# Patient Record
Sex: MALE | Race: WHITE | NOT HISPANIC OR LATINO
[De-identification: names, ages, dates, MRNs, and addresses within clinical notes are randomized per-mention and may not be internally consistent; named-entity substitution may affect disease eponyms.]

---

## 2017-02-16 ENCOUNTER — OTHER (OUTPATIENT)
Age: 62
End: 2017-02-16

## 2017-02-16 DIAGNOSIS — R07.89 OTHER CHEST PAIN: ICD-10-CM

## 2017-02-17 ENCOUNTER — OUTPATIENT (OUTPATIENT)
Dept: OUTPATIENT SERVICES | Facility: HOSPITAL | Age: 62
LOS: 1 days | End: 2017-02-17
Payer: COMMERCIAL

## 2017-02-17 ENCOUNTER — OUTPATIENT (OUTPATIENT)
Dept: OUTPATIENT SERVICES | Facility: HOSPITAL | Age: 62
LOS: 1 days | Discharge: HOME | End: 2017-02-17
Payer: COMMERCIAL

## 2017-02-17 ENCOUNTER — TRANSCRIPTION ENCOUNTER (OUTPATIENT)
Age: 62
End: 2017-02-17

## 2017-02-17 VITALS
RESPIRATION RATE: 12 BRPM | HEIGHT: 74 IN | DIASTOLIC BLOOD PRESSURE: 78 MMHG | WEIGHT: 220.02 LBS | HEART RATE: 78 BPM | OXYGEN SATURATION: 98 % | TEMPERATURE: 98 F | SYSTOLIC BLOOD PRESSURE: 125 MMHG

## 2017-02-17 VITALS
HEART RATE: 52 BPM | DIASTOLIC BLOOD PRESSURE: 73 MMHG | SYSTOLIC BLOOD PRESSURE: 133 MMHG | OXYGEN SATURATION: 97 % | RESPIRATION RATE: 16 BRPM

## 2017-02-17 DIAGNOSIS — R07.89 OTHER CHEST PAIN: ICD-10-CM

## 2017-02-17 DIAGNOSIS — Z90.49 ACQUIRED ABSENCE OF OTHER SPECIFIED PARTS OF DIGESTIVE TRACT: Chronic | ICD-10-CM

## 2017-02-17 LAB
ANION GAP SERPL CALC-SCNC: 13 MMOL/L — SIGNIFICANT CHANGE UP (ref 5–17)
APTT BLD: 38.9 SEC — HIGH (ref 27.5–37.4)
BLD GP AB SCN SERPL QL: SIGNIFICANT CHANGE UP
BUN SERPL-MCNC: 18 MG/DL — SIGNIFICANT CHANGE UP (ref 8–20)
CALCIUM SERPL-MCNC: 9.4 MG/DL — SIGNIFICANT CHANGE UP (ref 8.6–10.2)
CHLORIDE SERPL-SCNC: 102 MMOL/L — SIGNIFICANT CHANGE UP (ref 98–107)
CO2 SERPL-SCNC: 27 MMOL/L — SIGNIFICANT CHANGE UP (ref 22–29)
CREAT SERPL-MCNC: 0.96 MG/DL — SIGNIFICANT CHANGE UP (ref 0.5–1.3)
GLUCOSE SERPL-MCNC: 108 MG/DL — SIGNIFICANT CHANGE UP (ref 70–115)
HCT VFR BLD CALC: 45.5 % — SIGNIFICANT CHANGE UP (ref 42–52)
HGB BLD-MCNC: 15.8 G/DL — SIGNIFICANT CHANGE UP (ref 14–18)
INR BLD: 1.11 RATIO — SIGNIFICANT CHANGE UP (ref 0.88–1.16)
MCHC RBC-ENTMCNC: 31.8 PG — HIGH (ref 27–31)
MCHC RBC-ENTMCNC: 34.7 G/DL — SIGNIFICANT CHANGE UP (ref 32–36)
MCV RBC AUTO: 91.5 FL — SIGNIFICANT CHANGE UP (ref 80–94)
PLATELET # BLD AUTO: 192 K/UL — SIGNIFICANT CHANGE UP (ref 150–400)
POTASSIUM SERPL-MCNC: 4.2 MMOL/L — SIGNIFICANT CHANGE UP (ref 3.5–5.3)
POTASSIUM SERPL-SCNC: 4.2 MMOL/L — SIGNIFICANT CHANGE UP (ref 3.5–5.3)
PROTHROM AB SERPL-ACNC: 12.2 SEC — SIGNIFICANT CHANGE UP (ref 10–13.1)
RBC # BLD: 4.97 M/UL — SIGNIFICANT CHANGE UP (ref 4.6–6.2)
RBC # FLD: 13.1 % — SIGNIFICANT CHANGE UP (ref 11–15.6)
SODIUM SERPL-SCNC: 142 MMOL/L — SIGNIFICANT CHANGE UP (ref 135–145)
TYPE + AB SCN PNL BLD: SIGNIFICANT CHANGE UP
WBC # BLD: 6.1 K/UL — SIGNIFICANT CHANGE UP (ref 4.8–10.8)
WBC # FLD AUTO: 6.1 K/UL — SIGNIFICANT CHANGE UP (ref 4.8–10.8)

## 2017-02-17 PROCEDURE — 86850 RBC ANTIBODY SCREEN: CPT

## 2017-02-17 PROCEDURE — 93005 ELECTROCARDIOGRAM TRACING: CPT

## 2017-02-17 PROCEDURE — C1887: CPT

## 2017-02-17 PROCEDURE — 93306 TTE W/DOPPLER COMPLETE: CPT

## 2017-02-17 PROCEDURE — 85730 THROMBOPLASTIN TIME PARTIAL: CPT

## 2017-02-17 PROCEDURE — 86901 BLOOD TYPING SEROLOGIC RH(D): CPT

## 2017-02-17 PROCEDURE — 93306 TTE W/DOPPLER COMPLETE: CPT | Mod: 26,59

## 2017-02-17 PROCEDURE — 93351 STRESS TTE COMPLETE: CPT

## 2017-02-17 PROCEDURE — 85610 PROTHROMBIN TIME: CPT

## 2017-02-17 PROCEDURE — 86900 BLOOD TYPING SEROLOGIC ABO: CPT

## 2017-02-17 PROCEDURE — 80048 BASIC METABOLIC PNL TOTAL CA: CPT

## 2017-02-17 PROCEDURE — 93351 STRESS TTE COMPLETE: CPT | Mod: 26

## 2017-02-17 PROCEDURE — C1894: CPT

## 2017-02-17 PROCEDURE — 85027 COMPLETE CBC AUTOMATED: CPT

## 2017-02-17 PROCEDURE — 93454 CORONARY ARTERY ANGIO S&I: CPT

## 2017-02-17 PROCEDURE — 93010 ELECTROCARDIOGRAM REPORT: CPT

## 2017-02-17 PROCEDURE — C1769: CPT

## 2017-02-17 PROCEDURE — 93454 CORONARY ARTERY ANGIO S&I: CPT | Mod: 26

## 2017-02-17 RX ORDER — ATORVASTATIN CALCIUM 80 MG/1
1 TABLET, FILM COATED ORAL
Qty: 0 | Refills: 0 | COMMUNITY

## 2017-02-17 RX ORDER — ASPIRIN/CALCIUM CARB/MAGNESIUM 324 MG
1 TABLET ORAL
Qty: 0 | Refills: 0 | COMMUNITY

## 2017-02-17 NOTE — H&P CARDIOLOGY - HISTORY OF PRESENT ILLNESS
60 yo male history of hyperlipidemia exercises regularly developed chest pressure while weight lifting.  He had recently followed up with a Nuclear Stress test.  The nuclear stress test was positive of anterior wall motion abnormality.

## 2017-02-17 NOTE — DISCHARGE NOTE ADULT - PLAN OF CARE
Optimal cardiac function Restricted use with no heavy lifting of affected arm for 48 hours.  No submerging the arm in water for 48 hours.  You may start showering today.  Call your doctor for any bleeding, swelling, loss of sensation in the hand or fingers, or fingers turning blue.  If heavy bleeding or large lumps form, hold pressure at the spot and come to the Emergency Room.

## 2017-02-17 NOTE — DISCHARGE NOTE ADULT - INSTRUCTIONS
Choose lean meats and poultry without skin and prepare them without added saturated and trans fat.  Eat fish at least twice a week. Recent research shows that eating oily fish containing omega-3 fatty acids (for example, salmon, trout and herring) may help lower your risk of death from coronary artery disease.  Select fat-free, 1 percent fat and low-fat dairy products.  Cut back on foods containing partially hydrogenated vegetable oils to reduce trans fat in your diet.   To lower cholesterol, reduce saturated fat to no more than 5 to 6 percent of total calories. For someone eating 2,000 calories a day, that’s about 13 grams of saturated fat.  Cut back on beverages and foods with added sugars.  Choose and prepare foods with little or no salt. To lower blood pressure, aim to eat no more than 2,400 milligrams of sodium per day. Reducing daily intake to 1,500 mg is desirable because it can lower blood pressure even further.  If you drink alcohol, drink in moderation. That means one drink per day if you’re a woman and two drinks  per day if you’re a man.  Follow the American Heart Association recommendations when you eat out, and keep an eye on your portion sizes. d/c instructions and med rec

## 2017-02-17 NOTE — DISCHARGE NOTE ADULT - MEDICATION SUMMARY - MEDICATIONS TO TAKE
I will START or STAY ON the medications listed below when I get home from the hospital:    Aspirin Enteric Coated 81 mg oral delayed release tablet  -- 1 tab(s) by mouth once a day  -- Indication: For aspirin    Lipitor 20 mg oral tablet  -- 1 tab(s) by mouth once a day  -- Indication: For cholesterol

## 2017-02-17 NOTE — DISCHARGE NOTE ADULT - NS AS ACTIVITY OBS
No Heavy lifting/straining/Walking-Outdoors allowed/Showering allowed/Do not drive or operate machinery/Walking-Indoors allowed

## 2017-02-17 NOTE — DISCHARGE NOTE ADULT - CARE PROVIDER_API CALL
Perry Clemons), Cardiovascular Disease  [address] [place] [id number]  Phone: [phone]  Fax: [phone]

## 2017-02-17 NOTE — DISCHARGE NOTE ADULT - HOSPITAL COURSE
60 yo male history of hyperlipidemia exercises regularly developed chest pressure while weight lifting.  He had recently followed up with a Nuclear Stress test.  The nuclear stress test was positive of anterior wall motion abnormality. now S/P [facility] no intervention, via right radial artery, right wrist site benign.     Plan:  Remove wrist band in 1 hour  Wrist management discussed with patient  Continue current meds  Follow up with cardiologist in 2 weeks or sooner if needed  Bedrest x 1 hour  Discharge at 2100 if wrist site stable

## 2017-02-17 NOTE — DISCHARGE NOTE ADULT - CARE PROVIDERS DIRECT ADDRESSES
[email or web address]

## 2017-02-17 NOTE — DISCHARGE NOTE ADULT - CARE PLAN
Principal Discharge DX:	Abnormal stress test  Goal:	Optimal cardiac function  Instructions for follow-up, activity and diet:	Restricted use with no heavy lifting of affected arm for 48 hours.  No submerging the arm in water for 48 hours.  You may start showering today.  Call your doctor for any bleeding, swelling, loss of sensation in the hand or fingers, or fingers turning blue.  If heavy bleeding or large lumps form, hold pressure at the spot and come to the Emergency Room.

## 2017-02-20 DIAGNOSIS — R07.89 OTHER CHEST PAIN: ICD-10-CM

## 2017-02-24 DIAGNOSIS — R94.39 ABNORMAL RESULT OF OTHER CARDIOVASCULAR FUNCTION STUDY: ICD-10-CM

## 2017-05-15 ENCOUNTER — APPOINTMENT (OUTPATIENT)
Dept: DERMATOLOGY | Facility: CLINIC | Age: 62
End: 2017-05-15

## 2017-08-06 ENCOUNTER — RESULT REVIEW (OUTPATIENT)
Age: 62
End: 2017-08-06

## 2017-08-07 ENCOUNTER — APPOINTMENT (OUTPATIENT)
Dept: DERMATOLOGY | Facility: CLINIC | Age: 62
End: 2017-08-07
Payer: COMMERCIAL

## 2017-08-07 PROCEDURE — 11100 BX SKIN SUBCUTANEOUS&/MUCOUS MEMBRANE 1 LESION: CPT

## 2017-08-21 ENCOUNTER — APPOINTMENT (OUTPATIENT)
Dept: DERMATOLOGY | Facility: CLINIC | Age: 62
End: 2017-08-21

## 2018-01-22 ENCOUNTER — RESULT REVIEW (OUTPATIENT)
Age: 63
End: 2018-01-22

## 2018-01-22 ENCOUNTER — APPOINTMENT (OUTPATIENT)
Dept: DERMATOLOGY | Facility: CLINIC | Age: 63
End: 2018-01-22
Payer: COMMERCIAL

## 2018-01-22 PROCEDURE — 11100 BX SKIN SUBCUTANEOUS&/MUCOUS MEMBRANE 1 LESION: CPT

## 2018-03-13 ENCOUNTER — LABORATORY RESULT (OUTPATIENT)
Age: 63
End: 2018-03-13

## 2018-03-13 ENCOUNTER — APPOINTMENT (OUTPATIENT)
Dept: VASCULAR SURGERY | Facility: CLINIC | Age: 63
End: 2018-03-13
Payer: COMMERCIAL

## 2018-03-13 ENCOUNTER — APPOINTMENT (OUTPATIENT)
Dept: VASCULAR SURGERY | Facility: CLINIC | Age: 63
End: 2018-03-13

## 2018-03-13 VITALS
DIASTOLIC BLOOD PRESSURE: 71 MMHG | RESPIRATION RATE: 15 BRPM | WEIGHT: 208 LBS | HEIGHT: 73 IN | SYSTOLIC BLOOD PRESSURE: 127 MMHG | OXYGEN SATURATION: 99 % | BODY MASS INDEX: 27.57 KG/M2 | TEMPERATURE: 97.8 F | HEART RATE: 55 BPM

## 2018-03-13 DIAGNOSIS — Z82.49 FAMILY HISTORY OF ISCHEMIC HEART DISEASE AND OTHER DISEASES OF THE CIRCULATORY SYSTEM: ICD-10-CM

## 2018-03-13 DIAGNOSIS — Z85.820 PERSONAL HISTORY OF MALIGNANT MELANOMA OF SKIN: ICD-10-CM

## 2018-03-13 DIAGNOSIS — Z86.79 PERSONAL HISTORY OF OTHER DISEASES OF THE CIRCULATORY SYSTEM: ICD-10-CM

## 2018-03-13 DIAGNOSIS — L98.9 DISORDER OF THE SKIN AND SUBCUTANEOUS TISSUE, UNSPECIFIED: ICD-10-CM

## 2018-03-13 PROCEDURE — 24065 BIOPSY ARM/ELBOW SOFT TISSUE: CPT

## 2018-03-13 PROCEDURE — 99204 OFFICE O/P NEW MOD 45 MIN: CPT

## 2018-06-11 ENCOUNTER — APPOINTMENT (OUTPATIENT)
Dept: DERMATOLOGY | Facility: CLINIC | Age: 63
End: 2018-06-11

## 2018-10-25 PROBLEM — E78.5 HYPERLIPIDEMIA, UNSPECIFIED: Chronic | Status: ACTIVE | Noted: 2017-02-17

## 2018-10-31 ENCOUNTER — APPOINTMENT (OUTPATIENT)
Dept: VASCULAR SURGERY | Facility: CLINIC | Age: 63
End: 2018-10-31
Payer: COMMERCIAL

## 2018-10-31 VITALS
BODY MASS INDEX: 27.04 KG/M2 | OXYGEN SATURATION: 97 % | HEIGHT: 73 IN | DIASTOLIC BLOOD PRESSURE: 73 MMHG | WEIGHT: 204 LBS | SYSTOLIC BLOOD PRESSURE: 127 MMHG | TEMPERATURE: 97.7 F | HEART RATE: 76 BPM

## 2018-10-31 PROCEDURE — 93970 EXTREMITY STUDY: CPT

## 2018-10-31 PROCEDURE — 99214 OFFICE O/P EST MOD 30 MIN: CPT

## 2018-10-31 RX ORDER — ASPIRIN 81 MG
81 TABLET, DELAYED RELEASE (ENTERIC COATED) ORAL
Refills: 0 | Status: ACTIVE | COMMUNITY

## 2018-11-16 ENCOUNTER — APPOINTMENT (OUTPATIENT)
Dept: DERMATOLOGY | Facility: CLINIC | Age: 63
End: 2018-11-16
Payer: COMMERCIAL

## 2018-11-16 PROCEDURE — ZZZZZ: CPT

## 2019-01-23 ENCOUNTER — APPOINTMENT (OUTPATIENT)
Dept: DERMATOLOGY | Facility: CLINIC | Age: 64
End: 2019-01-23
Payer: COMMERCIAL

## 2019-01-23 PROCEDURE — 99212 OFFICE O/P EST SF 10 MIN: CPT

## 2019-01-28 ENCOUNTER — APPOINTMENT (OUTPATIENT)
Dept: DERMATOLOGY | Facility: CLINIC | Age: 64
End: 2019-01-28

## 2019-01-30 ENCOUNTER — APPOINTMENT (OUTPATIENT)
Age: 64
End: 2019-01-30
Payer: COMMERCIAL

## 2019-01-30 VITALS
HEIGHT: 73 IN | RESPIRATION RATE: 16 BRPM | OXYGEN SATURATION: 99 % | WEIGHT: 209 LBS | SYSTOLIC BLOOD PRESSURE: 99 MMHG | BODY MASS INDEX: 27.7 KG/M2 | HEART RATE: 59 BPM | TEMPERATURE: 97.9 F | DIASTOLIC BLOOD PRESSURE: 65 MMHG

## 2019-01-30 VITALS — DIASTOLIC BLOOD PRESSURE: 69 MMHG | SYSTOLIC BLOOD PRESSURE: 116 MMHG

## 2019-01-30 PROCEDURE — 36471 NJX SCLRSNT MLT INCMPTNT VN: CPT

## 2019-03-18 RX ORDER — VITAMIN B COMPLEX
CAPSULE ORAL
Refills: 0 | Status: ACTIVE | COMMUNITY

## 2019-03-18 RX ORDER — CHOLECALCIFEROL (VITAMIN D3) 250 MCG
250 MCG CAPSULE ORAL
Refills: 0 | Status: ACTIVE | COMMUNITY

## 2019-03-18 RX ORDER — MULTIVIT-MIN/IRON/FOLIC ACID/K 18-600-40
CAPSULE ORAL
Refills: 0 | Status: ACTIVE | COMMUNITY

## 2019-03-18 RX ORDER — OMEGA-3/DHA/EPA/FISH OIL 300-1000MG
1000 CAPSULE,DELAYED RELEASE (ENTERIC COATED) ORAL
Refills: 0 | Status: ACTIVE | COMMUNITY

## 2019-03-18 NOTE — PROCEDURE
[FreeTextEntry1] : right leg foam sclerotherapy [FreeTextEntry2] : Venous stasis and reflux [FreeTextEntry3] : Under aseptic technique after informed consent obtained, the large varicose veins from right leg were injected with polidocanol 1% solution using 30 gauge needle. Pressure dressing applied. Pt tolerated well.

## 2019-04-16 ENCOUNTER — RESULT REVIEW (OUTPATIENT)
Age: 64
End: 2019-04-16

## 2019-04-17 ENCOUNTER — APPOINTMENT (OUTPATIENT)
Dept: DERMATOLOGY | Facility: CLINIC | Age: 64
End: 2019-04-17
Payer: COMMERCIAL

## 2019-04-17 PROCEDURE — 99213 OFFICE O/P EST LOW 20 MIN: CPT

## 2020-04-26 ENCOUNTER — MESSAGE (OUTPATIENT)
Age: 65
End: 2020-04-26

## 2021-01-26 DIAGNOSIS — R06.00 DYSPNEA, UNSPECIFIED: ICD-10-CM

## 2021-01-28 ENCOUNTER — APPOINTMENT (OUTPATIENT)
Dept: CARDIOLOGY | Facility: CLINIC | Age: 66
End: 2021-01-28

## 2021-02-08 ENCOUNTER — FORM ENCOUNTER (OUTPATIENT)
Age: 66
End: 2021-02-08

## 2021-02-09 ENCOUNTER — OUTPATIENT (OUTPATIENT)
Dept: OUTPATIENT SERVICES | Facility: HOSPITAL | Age: 66
LOS: 1 days | End: 2021-02-09
Payer: COMMERCIAL

## 2021-02-09 DIAGNOSIS — R06.00 DYSPNEA, UNSPECIFIED: ICD-10-CM

## 2021-02-09 DIAGNOSIS — Z90.49 ACQUIRED ABSENCE OF OTHER SPECIFIED PARTS OF DIGESTIVE TRACT: Chronic | ICD-10-CM

## 2021-02-09 PROCEDURE — 93306 TTE W/DOPPLER COMPLETE: CPT | Mod: 26

## 2021-02-09 PROCEDURE — 93306 TTE W/DOPPLER COMPLETE: CPT

## 2021-02-13 DIAGNOSIS — E03.9 HYPOTHYROIDISM, UNSPECIFIED: ICD-10-CM

## 2021-02-13 DIAGNOSIS — Z86.39 PERSONAL HISTORY OF OTHER ENDOCRINE, NUTRITIONAL AND METABOLIC DISEASE: ICD-10-CM

## 2021-02-13 LAB
25(OH)D3 SERPL-MCNC: 53.8 NG/ML
ALBUMIN SERPL ELPH-MCNC: 4.4 G/DL
ALP BLD-CCNC: 55 U/L
ALT SERPL-CCNC: 34 U/L
ANION GAP SERPL CALC-SCNC: 16 MMOL/L
AST SERPL-CCNC: 24 U/L
BASOPHILS # BLD AUTO: 0.05 K/UL
BASOPHILS NFR BLD AUTO: 0.9 %
BILIRUB SERPL-MCNC: 0.5 MG/DL
BUN SERPL-MCNC: 19 MG/DL
CALCIUM SERPL-MCNC: 9.7 MG/DL
CHLORIDE SERPL-SCNC: 103 MMOL/L
CHOLEST SERPL-MCNC: 166 MG/DL
CO2 SERPL-SCNC: 22 MMOL/L
CREAT SERPL-MCNC: 1.16 MG/DL
EOSINOPHIL # BLD AUTO: 0.21 K/UL
EOSINOPHIL NFR BLD AUTO: 3.7 %
GLUCOSE SERPL-MCNC: 97 MG/DL
HCT VFR BLD CALC: 48.3 %
HDLC SERPL-MCNC: 47 MG/DL
HGB BLD-MCNC: 16.2 G/DL
IMM GRANULOCYTES NFR BLD AUTO: 0.2 %
LDLC SERPL CALC-MCNC: 96 MG/DL
LYMPHOCYTES # BLD AUTO: 1.92 K/UL
LYMPHOCYTES NFR BLD AUTO: 33.7 %
MAN DIFF?: NORMAL
MCHC RBC-ENTMCNC: 31.8 PG
MCHC RBC-ENTMCNC: 33.5 GM/DL
MCV RBC AUTO: 94.7 FL
MONOCYTES # BLD AUTO: 0.62 K/UL
MONOCYTES NFR BLD AUTO: 10.9 %
NEUTROPHILS # BLD AUTO: 2.89 K/UL
NEUTROPHILS NFR BLD AUTO: 50.6 %
NONHDLC SERPL-MCNC: 119 MG/DL
NT-PROBNP SERPL-MCNC: 29 PG/ML
PLATELET # BLD AUTO: 206 K/UL
POTASSIUM SERPL-SCNC: 4.3 MMOL/L
PROT SERPL-MCNC: 6.7 G/DL
PSA SERPL-MCNC: 3.27 NG/ML
RBC # BLD: 5.1 M/UL
RBC # FLD: 12.7 %
SODIUM SERPL-SCNC: 141 MMOL/L
TRIGL SERPL-MCNC: 119 MG/DL
TSH SERPL-ACNC: 5.11 UIU/ML
WBC # FLD AUTO: 5.7 K/UL

## 2021-02-18 LAB
ALBUMIN SERPL ELPH-MCNC: 4.5 G/DL
ALP BLD-CCNC: 57 U/L
ALT SERPL-CCNC: 37 U/L
AST SERPL-CCNC: 26 U/L
BILIRUB DIRECT SERPL-MCNC: 0.2 MG/DL
BILIRUB INDIRECT SERPL-MCNC: 0.6 MG/DL
BILIRUB SERPL-MCNC: 0.8 MG/DL
CHOLEST SERPL-MCNC: 142 MG/DL
HDLC SERPL-MCNC: 45 MG/DL
LDLC SERPL CALC-MCNC: 70 MG/DL
NONHDLC SERPL-MCNC: 96 MG/DL
PROT SERPL-MCNC: 6.6 G/DL
THYROGLOB AB SERPL-ACNC: <20 IU/ML
THYROPEROXIDASE AB SERPL IA-ACNC: 25.6 IU/ML
TRIGL SERPL-MCNC: 132 MG/DL
TSH SERPL-ACNC: 2.9 UIU/ML

## 2021-02-23 ENCOUNTER — APPOINTMENT (OUTPATIENT)
Dept: DERMATOLOGY | Facility: CLINIC | Age: 66
End: 2021-02-23

## 2023-04-24 ENCOUNTER — APPOINTMENT (OUTPATIENT)
Dept: VASCULAR SURGERY | Facility: CLINIC | Age: 68
End: 2023-04-24
Payer: COMMERCIAL

## 2023-04-24 ENCOUNTER — NON-APPOINTMENT (OUTPATIENT)
Age: 68
End: 2023-04-24

## 2023-04-24 VITALS — SYSTOLIC BLOOD PRESSURE: 146 MMHG | DIASTOLIC BLOOD PRESSURE: 84 MMHG | HEART RATE: 47 BPM

## 2023-04-24 PROCEDURE — 99204 OFFICE O/P NEW MOD 45 MIN: CPT

## 2023-04-24 RX ORDER — ATORVASTATIN CALCIUM 40 MG/1
40 TABLET, FILM COATED ORAL
Qty: 1 | Refills: 3 | Status: DISCONTINUED | COMMUNITY
Start: 2021-02-13 | End: 2023-04-24

## 2023-04-24 NOTE — PHYSICAL EXAM
[Normal Breath Sounds] : Normal breath sounds [Alert] : alert [Oriented to Person] : oriented to person [Oriented to Place] : oriented to place [Oriented to Time] : oriented to time [Calm] : calm [JVD] : no jugular venous distention  [2+] : left 2+ [Ankle Swelling (On Exam)] : present [Ankle Swelling On The Right] : mild [Varicose Veins Of Lower Extremities] : present [Varicose Veins Of The Right Leg] : of the right leg [Ankle Swelling On The Left] : moderate [] : bilaterally [de-identified] : Awake and Alert. [de-identified] : right lower extremity varicose veins > 3mm  [de-identified] : No gross motor or sensory deficits. [de-identified] : Appropriate affect.

## 2023-04-24 NOTE — REASON FOR VISIT
[Initial Evaluation] : an initial evaluation [FreeTextEntry1] : Symptomatic Varicose Veins right lower extremity

## 2023-04-24 NOTE — HISTORY OF PRESENT ILLNESS
[FreeTextEntry1] : 66 yo male presents for an evaluation of right lower extremity symptomatic varicose veins. He reports pain and swelling associated with varicose veins. He wears compression stockings with little improvement of his symptoms. He denies a history of DVT or SVT. He is s/p a left GSV ablation in the past.

## 2023-05-01 ENCOUNTER — APPOINTMENT (OUTPATIENT)
Dept: VASCULAR SURGERY | Facility: CLINIC | Age: 68
End: 2023-05-01
Payer: COMMERCIAL

## 2023-05-01 PROCEDURE — 93971 EXTREMITY STUDY: CPT

## 2023-05-01 PROCEDURE — 99213 OFFICE O/P EST LOW 20 MIN: CPT

## 2023-05-01 NOTE — DATA REVIEWED
[FreeTextEntry1] : Right lower extremity venous duplex - No DVT or SVT, GSV in thigh not visualized, remnant GSV with reflux

## 2023-05-01 NOTE — ASSESSMENT
[FreeTextEntry1] : 66 yo male with right lower extremity symptomatic varicose veins.  Patient continues to have pain and swelling despite wearing compression stockings. He underwent a right lower extremity venous duplex that demonstrated reflux in the GSV remnant. He is an excellent candidate for Varithena ablation of the remnant right greater saphenous vein. The risks and benefits were discussed with the patient including infection, bleeding, and DVT. The patient would like to proceed with the procedure. The patient will continue to wear compression stockings until the time of the procedure.\par

## 2023-05-01 NOTE — PHYSICAL EXAM
[Normal Breath Sounds] : Normal breath sounds [Ankle Swelling (On Exam)] : present [Ankle Swelling On The Right] : mild [Varicose Veins Of Lower Extremities] : present [Varicose Veins Of The Right Leg] : of the right leg [Ankle Swelling On The Left] : moderate [Alert] : alert [Oriented to Person] : oriented to person [Oriented to Place] : oriented to place [Oriented to Time] : oriented to time [Calm] : calm [JVD] : no jugular venous distention  [] : not present [de-identified] : Awake and Alert. [de-identified] : right lower extremity varicose veins > 3mm  [de-identified] : No gross motor or sensory deficits. [de-identified] : Appropriate affect.

## 2023-05-01 NOTE — REVIEW OF SYSTEMS
[Lower Ext Edema] : lower extremity edema [Limb Pain] : limb pain [Limb Swelling] : limb swelling [Fever] : no fever [Chills] : no chills

## 2023-05-01 NOTE — HISTORY OF PRESENT ILLNESS
[FreeTextEntry1] : 66 yo male presents for an evaluation of right lower extremity symptomatic varicose veins. He reports pain and swelling associated with varicose veins. He wears compression stockings with little improvement of his symptoms. He denies a history of DVT or SVT. He is s/p a left GSV ablation in the past.  [de-identified] : 66 yo male returns in follow up for right lower extremity symptomatic varicose veins. He continues to have pain and swelling despite daily compression therapy.  He underwent a venous duplex and is here to discuss the results and additional treatment options.

## 2023-05-31 ENCOUNTER — APPOINTMENT (OUTPATIENT)
Dept: VASCULAR SURGERY | Facility: CLINIC | Age: 68
End: 2023-05-31
Payer: COMMERCIAL

## 2023-05-31 VITALS
HEART RATE: 54 BPM | WEIGHT: 200 LBS | SYSTOLIC BLOOD PRESSURE: 127 MMHG | OXYGEN SATURATION: 98 % | HEIGHT: 73 IN | DIASTOLIC BLOOD PRESSURE: 81 MMHG | BODY MASS INDEX: 26.51 KG/M2

## 2023-05-31 PROCEDURE — 36465Z: CUSTOM

## 2023-05-31 NOTE — ADDENDUM
[FreeTextEntry1] : He tolerated the procedure. He walked for 5 minutes in the pizarro prior to discharge.  He verbalized understanding of the post procedure instructions.\par \par He will follow up in 1 week.\par

## 2023-05-31 NOTE — PROCEDURE
[FreeTextEntry1] : Varithena ablation of right GSV remnant [FreeTextEntry2] : Symptomatic varicose veins with venous insufficiency [FreeTextEntry3] : Patient was seen in the examination room and consent was obtained.  Patient was then taken to the procedure room where a timeout was called to verify the patient's identity and laterality procedure.  The patient's right greater saphenous vein remnant was then mapped.  Patient's leg was then prepped and draped in standard surgical fashion.  Patient was given an injection of 1% plain lidocaine in the distal calf.  A micropuncture needle was used to access the greater saphenous vein.  A micropuncture wire was then inserted through the needle.  The needle was then removed.  Micropuncture dilator sheath was then inserted over the wire.  The patient's leg was then elevated for 3 minutes.  The dilator and wire were then removed.  10 cc of injectable saline was then inserted through the sheath.  A Varithena ablation was then performed in the standard fashion.  Compression was held on the perforators and the patient was asked to squeeze his calf muscles 20 times.  Three minutes was allowed to pass.  A repeat ultrasound was found which demonstrated an excellent result.  Sterile dressing was then applied.  The patient tolerated the procedure and was discharged in stable condition.\par

## 2023-06-07 ENCOUNTER — APPOINTMENT (OUTPATIENT)
Dept: VASCULAR SURGERY | Facility: CLINIC | Age: 68
End: 2023-06-07
Payer: COMMERCIAL

## 2023-06-07 VITALS — HEIGHT: 73 IN | BODY MASS INDEX: 26.51 KG/M2 | WEIGHT: 200 LBS

## 2023-06-07 DIAGNOSIS — I82.441 ACUTE EMBOLISM AND THROMBOSIS OF RIGHT TIBIAL VEIN: ICD-10-CM

## 2023-06-07 PROCEDURE — 99213 OFFICE O/P EST LOW 20 MIN: CPT

## 2023-06-07 PROCEDURE — 93971 EXTREMITY STUDY: CPT

## 2023-06-09 PROBLEM — I82.441 ACUTE DEEP VEIN THROMBOSIS (DVT) OF TIBIAL VEIN OF RIGHT LOWER EXTREMITY: Status: ACTIVE | Noted: 2023-06-07

## 2023-06-09 NOTE — PHYSICAL EXAM
[Normal Breath Sounds] : Normal breath sounds [Ankle Swelling (On Exam)] : present [Ankle Swelling On The Right] : mild [Varicose Veins Of Lower Extremities] : present [Varicose Veins Of The Right Leg] : of the right leg [Ankle Swelling On The Left] : moderate [Alert] : alert [Oriented to Person] : oriented to person [Oriented to Place] : oriented to place [Oriented to Time] : oriented to time [Calm] : calm [JVD] : no jugular venous distention  [] : not present [de-identified] : Awake and Alert. [de-identified] : right lower extremity varicose veins > 3mm,  [de-identified] : No gross motor or sensory deficits. [de-identified] : Appropriate affect.

## 2023-06-09 NOTE — HISTORY OF PRESENT ILLNESS
[FreeTextEntry1] : 68 yo male presents for an evaluation of right lower extremity symptomatic varicose veins. He reports pain and swelling associated with varicose veins. He wears compression stockings with little improvement of his symptoms. He denies a history of DVT or SVT. He is s/p a left GSV ablation in the past.  [de-identified] : 66 yo male returns in follow up for right lower extremity symptomatic varicose veins. He is one week s/p a Varithena ablation of the right GSV remnant.  The patient reports his varicose veins have not decompressed. He denies pain or swelling in his leg post procedure. The patient take aspirin on a daily basis.

## 2023-06-09 NOTE — REVIEW OF SYSTEMS
[Lower Ext Edema] : lower extremity edema [Fever] : no fever [Chills] : no chills [Limb Pain] : no limb pain [Limb Swelling] : no limb swelling

## 2023-06-09 NOTE — DATA REVIEWED
[FreeTextEntry1] : Right lower extremity venous duplex - DVT in 1 of 2 paired posterior tibial veins mid segment

## 2023-07-10 ENCOUNTER — APPOINTMENT (OUTPATIENT)
Dept: VASCULAR SURGERY | Facility: CLINIC | Age: 68
End: 2023-07-10

## 2023-12-11 ENCOUNTER — APPOINTMENT (OUTPATIENT)
Dept: VASCULAR SURGERY | Facility: CLINIC | Age: 68
End: 2023-12-11

## 2024-01-08 ENCOUNTER — APPOINTMENT (OUTPATIENT)
Dept: CT IMAGING | Facility: CLINIC | Age: 69
End: 2024-01-08
Payer: COMMERCIAL

## 2024-01-08 ENCOUNTER — APPOINTMENT (OUTPATIENT)
Dept: ORTHOPEDIC SURGERY | Facility: CLINIC | Age: 69
End: 2024-01-08
Payer: COMMERCIAL

## 2024-01-08 ENCOUNTER — OUTPATIENT (OUTPATIENT)
Dept: OUTPATIENT SERVICES | Facility: HOSPITAL | Age: 69
LOS: 1 days | End: 2024-01-08

## 2024-01-08 ENCOUNTER — RESULT REVIEW (OUTPATIENT)
Age: 69
End: 2024-01-08

## 2024-01-08 ENCOUNTER — LABORATORY RESULT (OUTPATIENT)
Age: 69
End: 2024-01-08

## 2024-01-08 VITALS
HEART RATE: 61 BPM | BODY MASS INDEX: 26.11 KG/M2 | OXYGEN SATURATION: 98 % | SYSTOLIC BLOOD PRESSURE: 150 MMHG | HEIGHT: 73 IN | WEIGHT: 197 LBS | DIASTOLIC BLOOD PRESSURE: 80 MMHG

## 2024-01-08 DIAGNOSIS — G89.29 PAIN IN RIGHT KNEE: ICD-10-CM

## 2024-01-08 DIAGNOSIS — Z01.812 ENCOUNTER FOR PREPROCEDURAL LABORATORY EXAMINATION: ICD-10-CM

## 2024-01-08 DIAGNOSIS — Z90.49 ACQUIRED ABSENCE OF OTHER SPECIFIED PARTS OF DIGESTIVE TRACT: Chronic | ICD-10-CM

## 2024-01-08 DIAGNOSIS — Z78.9 OTHER SPECIFIED HEALTH STATUS: ICD-10-CM

## 2024-01-08 DIAGNOSIS — M17.11 UNILATERAL PRIMARY OSTEOARTHRITIS, RIGHT KNEE: ICD-10-CM

## 2024-01-08 DIAGNOSIS — M25.561 PAIN IN RIGHT KNEE: ICD-10-CM

## 2024-01-08 DIAGNOSIS — I83.813 VARICOSE VEINS OF BILATERAL LOWER EXTREMITIES WITH PAIN: ICD-10-CM

## 2024-01-08 DIAGNOSIS — I87.2 VENOUS INSUFFICIENCY (CHRONIC) (PERIPHERAL): ICD-10-CM

## 2024-01-08 PROCEDURE — 77073 BONE LENGTH STUDIES: CPT | Mod: 26

## 2024-01-08 PROCEDURE — 99215 OFFICE O/P EST HI 40 MIN: CPT | Mod: 25

## 2024-01-08 PROCEDURE — 73700 CT LOWER EXTREMITY W/O DYE: CPT | Mod: 26,RT

## 2024-01-08 PROCEDURE — 73564 X-RAY EXAM KNEE 4 OR MORE: CPT | Mod: 26,50,59

## 2024-01-08 PROCEDURE — 99204 OFFICE O/P NEW MOD 45 MIN: CPT

## 2024-01-08 RX ORDER — ATORVASTATIN CALCIUM 80 MG/1
TABLET, FILM COATED ORAL
Refills: 0 | Status: ACTIVE | COMMUNITY

## 2024-01-08 RX ORDER — CLONAZEPAM 2 MG/1
TABLET ORAL
Refills: 0 | Status: ACTIVE | COMMUNITY

## 2024-01-08 RX ORDER — BUPROPION HYDROCHLORIDE 100 MG/1
TABLET, FILM COATED ORAL
Refills: 0 | Status: ACTIVE | COMMUNITY

## 2024-01-08 NOTE — DISCUSSION/SUMMARY
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 We had a long discussion today on choices of treatment.,  He could have a sub-chondral drilling with BMAC injection of the MFC new stress reaction plus  brace . However, this would be temporizing only and although would likely heal the stress fx he would still have extruded meniscus and poor chondral coverage.  He could also have a high tibial osteotomy and OA allograft however we described the results at 75-85% good to excellent.  Therefore we also discussed while he is healthy and active considering a uni-condylar knee replacement which I believe at age 68 is the preferred option. This option offers him quicker recovery and 90-95% success rate with longevityy up to 20 years.  I have discussed with Dr Samson who will see him today later and discuss this option more thoroughly.  Risks and benefits of all of these options were fully discussed and all questions answered.  PLan  Refer to ELLEN Samson asap  He will phone me with any further questions.,

## 2024-01-08 NOTE — HISTORY OF PRESENT ILLNESS
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  6 months ago he worked out regularly and had only occasional twinges of pain. More recently he is using a cane and having difficulty on stairs. He has had no treatment but does work out with strength training regularly. He comes for treatment opinion. He has a Hx of known previous meniscal tear in the same knee treaded non operatively and some early OA. This is clearly tough a recent exacerbation.

## 2024-01-08 NOTE — PHYSICAL EXAM
[de-identified] : Right knee medial joint pain.,  Quads 5/5 RM 0-135 degrees.  [de-identified] : Previous standing x-rays of right knee reviewed by me today show mild medial joint narrowing KL2-3 no acute changes.  MRI done at [facility] also interpreted by Dr Marlow and evaluated by me personally show normal PF and lateral joints and medial joint with SUb-chondral LFC stress reaction and significant chondral loss plus extruded old meniscus tear or posterior horn medial meniscus.  He also has some minimal stress reactive edema in medial tibial plateau.

## 2024-01-09 LAB
25(OH)D3 SERPL-MCNC: 46.1 NG/ML
ALBUMIN SERPL ELPH-MCNC: 5 G/DL
ALP BLD-CCNC: 57 U/L
ALT SERPL-CCNC: 40 U/L
ANION GAP SERPL CALC-SCNC: 13 MMOL/L
APPEARANCE: CLEAR
APTT BLD: 32.3 SEC
AST SERPL-CCNC: 31 U/L
BACTERIA: NEGATIVE /HPF
BASOPHILS # BLD AUTO: 0.05 K/UL
BASOPHILS NFR BLD AUTO: 0.6 %
BILIRUB SERPL-MCNC: 0.8 MG/DL
BILIRUBIN URINE: NEGATIVE
BLOOD URINE: NEGATIVE
BUN SERPL-MCNC: 17 MG/DL
CALCIUM OXALATE CRYSTALS: PRESENT
CALCIUM SERPL-MCNC: 10.4 MG/DL
CAST: 1 /LPF
CHLORIDE SERPL-SCNC: 105 MMOL/L
CO2 SERPL-SCNC: 25 MMOL/L
COLOR: YELLOW
CREAT SERPL-MCNC: 1.03 MG/DL
EGFR: 78 ML/MIN/1.73M2
EOSINOPHIL # BLD AUTO: 0.08 K/UL
EOSINOPHIL NFR BLD AUTO: 1 %
EPITHELIAL CELLS: 1 /HPF
ESTIMATED AVERAGE GLUCOSE: 114 MG/DL
FERRITIN SERPL-MCNC: 134 NG/ML
GLUCOSE QUALITATIVE U: NEGATIVE MG/DL
GLUCOSE SERPL-MCNC: 107 MG/DL
HBA1C MFR BLD HPLC: 5.6 %
HCT VFR BLD CALC: 50.6 %
HGB BLD-MCNC: 17 G/DL
IMM GRANULOCYTES NFR BLD AUTO: 0.2 %
INR PPP: 1 RATIO
IRON SATN MFR SERPL: 45 %
IRON SERPL-MCNC: 165 UG/DL
KETONES URINE: NEGATIVE MG/DL
LEUKOCYTE ESTERASE URINE: NEGATIVE
LYMPHOCYTES # BLD AUTO: 1.94 K/UL
LYMPHOCYTES NFR BLD AUTO: 23.1 %
MAN DIFF?: NORMAL
MCHC RBC-ENTMCNC: 31.8 PG
MCHC RBC-ENTMCNC: 33.6 GM/DL
MCV RBC AUTO: 94.6 FL
MICROSCOPIC-UA: NORMAL
MONOCYTES # BLD AUTO: 0.66 K/UL
MONOCYTES NFR BLD AUTO: 7.9 %
MRSA SPEC QL CULT: NOT DETECTED
NEUTROPHILS # BLD AUTO: 5.65 K/UL
NEUTROPHILS NFR BLD AUTO: 67.2 %
NITRITE URINE: NEGATIVE
PH URINE: 5.5
PLATELET # BLD AUTO: 203 K/UL
POTASSIUM SERPL-SCNC: 4.7 MMOL/L
PROT SERPL-MCNC: 7.4 G/DL
PROTEIN URINE: NEGATIVE MG/DL
PT BLD: 11.3 SEC
RBC # BLD: 5.35 M/UL
RBC # FLD: 13.2 %
RED BLOOD CELLS URINE: 0 /HPF
REVIEW: NORMAL
SODIUM SERPL-SCNC: 142 MMOL/L
SPECIFIC GRAVITY URINE: 1.02
STAPH AUREUS (SA): NOT DETECTED
TIBC SERPL-MCNC: 370 UG/DL
TRANSFERRIN SERPL-MCNC: 290 MG/DL
UIBC SERPL-MCNC: 205 UG/DL
UROBILINOGEN URINE: 0.2 MG/DL
WBC # FLD AUTO: 8.4 K/UL
WHITE BLOOD CELLS URINE: 1 /HPF

## 2024-01-15 ENCOUNTER — NON-APPOINTMENT (OUTPATIENT)
Age: 69
End: 2024-01-15

## 2024-01-30 ENCOUNTER — APPOINTMENT (OUTPATIENT)
Age: 69
End: 2024-01-30
Payer: COMMERCIAL

## 2024-01-30 PROCEDURE — 27446 REVISION OF KNEE JOINT: CPT | Mod: RT

## 2024-01-31 ENCOUNTER — APPOINTMENT (OUTPATIENT)
Dept: ORTHOPEDIC SURGERY | Facility: CLINIC | Age: 69
End: 2024-01-31
Payer: COMMERCIAL

## 2024-01-31 ENCOUNTER — OUTPATIENT (OUTPATIENT)
Dept: OUTPATIENT SERVICES | Facility: HOSPITAL | Age: 69
LOS: 1 days | End: 2024-01-31
Payer: COMMERCIAL

## 2024-01-31 ENCOUNTER — NON-APPOINTMENT (OUTPATIENT)
Age: 69
End: 2024-01-31

## 2024-01-31 VITALS
DIASTOLIC BLOOD PRESSURE: 73 MMHG | BODY MASS INDEX: 26.11 KG/M2 | OXYGEN SATURATION: 96 % | HEART RATE: 53 BPM | WEIGHT: 197 LBS | SYSTOLIC BLOOD PRESSURE: 122 MMHG | HEIGHT: 73 IN

## 2024-01-31 DIAGNOSIS — Z90.49 ACQUIRED ABSENCE OF OTHER SPECIFIED PARTS OF DIGESTIVE TRACT: Chronic | ICD-10-CM

## 2024-01-31 PROCEDURE — 77073 BONE LENGTH STUDIES: CPT | Mod: 26

## 2024-01-31 PROCEDURE — 73562 X-RAY EXAM OF KNEE 3: CPT

## 2024-01-31 PROCEDURE — 99024 POSTOP FOLLOW-UP VISIT: CPT

## 2024-01-31 PROCEDURE — 77073 BONE LENGTH STUDIES: CPT

## 2024-01-31 PROCEDURE — 73562 X-RAY EXAM OF KNEE 3: CPT | Mod: 26,RT

## 2024-01-31 NOTE — HISTORY OF PRESENT ILLNESS
[de-identified] : Omi is a 68 year old male who presents today for a post op visit.  Post op Day: 1 day Surgery Type: Medial Partial Knee Arthroplasty Side of Surgery: Right Date of Surgery: 01.30.24  Pain Level: 1 Assistive Device: walker Satisfaction Level: Very Satisfied, Satisfied  Activities: Walking [de-identified] : Omi is a 68 year old male who presents today 1 day s/p right medial partial knee arthroplasty. He states he is feeling good. [de-identified] : Right Knee Exam:  Skin:  Healthy appearing. No erythema. Minimal ecchymosis.  No drainage.  Dermabond intact.        Dressings: Dressings changed today and new dressings provided for changing in one week.            Knee Joint Swelling: Swelling. Mild.  Alignment: Neutral.                                                         ROM Extension: 0 degrees.   ROM Flexion: 90 degrees.     Medial Collateral Ligament Laxity:  Normal laxity. Good endpoint.  Lateral Collateral Ligament Laxity: Normal laxity. Good endpoint.  Instability: No flexion or extension instability  Anterior Drawer Test: No significant translation. Good endpoint.  Posterior Drawer Test:  Stable with good endpoint  Motor Strength:   Quadriceps strength is 5 out of 5 Hamstring strength is 5 out of 5 Ankle dorsiflexion strength is 5 out of 5 Ankle plantarflexion strength is 5 out of 5  Sensation:  Light tough sensation in the lower extremity is grossly normal.   Sensory change is located lateral to incision as expected.  Pulses: Pulses are palpable at the ankle at the Dorsalis Pedis Artery.   Pulses are palpable at the Posterior Tibialis Artery.  Gait: Limping Gait expected after surgery.    Assistive Devices: Walker . [de-identified] : Post Op  X-Rays Partial Knee Replacement:     Examination of the Knee: Right     Views: AP, Lateral, Merchant, Hip to Ankle        AP Standing View:  Medial Partial Knee Replacement in good position.  No signs of Loosening.  No subsidence.  No fracture.  Good insert space thickness.  Optimal tracking of femoral component on tibial insert.     Lateral View:  Medial Partial Knee Replacement in good position.  Fix is anatomic on both femur and tibia.  Good Cement fixation.     [place] / Merchant View:  Patellofemoral joint shows central tracking.  Medial implant is seen tracking centrally on the tibial implant in this flexion view.        Bilateral Hip to Ankle Standing View:  Knee Alignment is in good overall position.  Alignment is 4 degrees Valgus on the Right.  Alignment is 2 degrees Valgus on the Left. [de-identified] : Assessment:  - Post Op Day 1 after Medial Partial Knee Replacement, Dewayne Robotic-Assisted.  - All components cemented.  - X-rays taken and confirmed implants in good position and no complications.  - Pain well controlled with blocks still working. Not taking narcotics.  - Patient has medications that were prescribed.  - Patient was examined and is doing appropriately for POD #1.  - Dermabond skin adhesive on incisions is intact.  - Incision clean and dry with no drainage.  - No signs of infection.  - No calf tenderness. Calf is soft. No signs of DVT.  - Stable collateral ligament exam and cruciate ligament exam.  - Range of Motion is good with extension and 90 degrees of flexion today.  - Gait is good with assistive device.  - Patient is very satisfied so far, understands and has reviewed our protocols and has had their questions answered.        Plan:     Dressings:  -  Dressing changed today.  -  Remove and change in one week. Take Photo of incisions and email or text to us for review.  -  After 3 weeks, may clean skin adhesive off with rubbing alcohol.  -  May shower with dressing on. They are waterproof. If it gets wet inside, remove and change dressing.  -  When changing dressing each week, you may shower with dressing off, don't scrub over glue, pat dry and replace dressing.        Therapy & Exercises:  -  Continue Post op protocol  -  Begin home exercise program and PT.  -  Full Weight bearing.  -  Isometric Quad Sets 3 sets of 10, 3 times a day.  -  Quarter squats at counter Sets 3 sets of 10, 3 times a day.  -  Ankle pumps throughout the day to improve circulation.  -  Walker or crutches for safety until progressed by PT  -  Use Knee Cryo cuff cold therapy at all times when not up and walking for the first 10 days.  -  Use Knee Cryo cuff cold therapy after exercises, PT and walks for 6 weeks to reduce swelling and discomfort.  -  Start Outpatient PT preferably in 10 to 14 days to allow swelling to resolve and incision to heal.     Medications:  -  Continue with ASA orally BID for 4 weeks for DVT prophylaxis.  -  Finish oral antibiotics prophylaxis.  -  Tylenol and Ibuprofen for pain as directed.  -  Hydrocodone or alternative provided for moderate to severe pain.     Follow-up:  -  Followup in 3 weeks.

## 2024-02-08 ENCOUNTER — TRANSCRIPTION ENCOUNTER (OUTPATIENT)
Age: 69
End: 2024-02-08

## 2024-02-15 NOTE — HISTORY OF PRESENT ILLNESS
[de-identified] : Omi is a 68 year old male who presents today for a post op visit.  Post op Day: 3 weeks Surgery Type: Medial Partial Knee Arthroplasty Side of Surgery: Right Date of Surgery: 1.30.24  Pain Level:  Assistive Device:  Satisfaction Level: Very Satisfied  Activities: Walking [de-identified] : Omi is a 68 year old male who presents today 3 weeks s/p right medial partial knee arthroplasty. He states he is feeling...

## 2024-02-21 ENCOUNTER — APPOINTMENT (OUTPATIENT)
Dept: ORTHOPEDIC SURGERY | Facility: CLINIC | Age: 69
End: 2024-02-21
Payer: COMMERCIAL

## 2024-02-21 VITALS
DIASTOLIC BLOOD PRESSURE: 81 MMHG | HEIGHT: 73 IN | BODY MASS INDEX: 26.11 KG/M2 | OXYGEN SATURATION: 100 % | WEIGHT: 197 LBS | HEART RATE: 54 BPM | RESPIRATION RATE: 16 BRPM | SYSTOLIC BLOOD PRESSURE: 138 MMHG

## 2024-02-21 PROCEDURE — 99024 POSTOP FOLLOW-UP VISIT: CPT

## 2024-03-20 ENCOUNTER — OUTPATIENT (OUTPATIENT)
Dept: OUTPATIENT SERVICES | Facility: HOSPITAL | Age: 69
LOS: 1 days | End: 2024-03-20
Payer: COMMERCIAL

## 2024-03-20 ENCOUNTER — APPOINTMENT (OUTPATIENT)
Dept: ORTHOPEDIC SURGERY | Facility: CLINIC | Age: 69
End: 2024-03-20
Payer: COMMERCIAL

## 2024-03-20 VITALS
SYSTOLIC BLOOD PRESSURE: 149 MMHG | HEIGHT: 73 IN | OXYGEN SATURATION: 98 % | DIASTOLIC BLOOD PRESSURE: 73 MMHG | HEART RATE: 54 BPM | WEIGHT: 197 LBS | BODY MASS INDEX: 26.11 KG/M2

## 2024-03-20 DIAGNOSIS — Z90.49 ACQUIRED ABSENCE OF OTHER SPECIFIED PARTS OF DIGESTIVE TRACT: Chronic | ICD-10-CM

## 2024-03-20 PROCEDURE — 73562 X-RAY EXAM OF KNEE 3: CPT | Mod: 26,RT

## 2024-03-20 PROCEDURE — 99024 POSTOP FOLLOW-UP VISIT: CPT

## 2024-03-20 PROCEDURE — 77073 BONE LENGTH STUDIES: CPT

## 2024-03-20 PROCEDURE — 73562 X-RAY EXAM OF KNEE 3: CPT

## 2024-03-20 PROCEDURE — 77073 BONE LENGTH STUDIES: CPT | Mod: 26

## 2024-03-20 NOTE — HISTORY OF PRESENT ILLNESS
[de-identified] : Omi is a 68 year old male who presents today for a post op visit.   Post op Day: 7 weeks Surgery Type: Medial Partial Knee Arthroplasty  Side of Surgery: Right Date of Surgery:  01.30.24   Pain Level: 1 Assistive Device: none Satisfaction Level: Very Satisfied   Activities: Walking, home exercises and outpatient PT [de-identified] : Omi is a 68 year old male who presents today 7 weeks s/p right medial partial knee arthroplasty. He states he is feeling great. He is working and walking and very pleased so far. [de-identified] : Right Knee Exam:   Incision: Healed medial parapatellar incision.   Incision Pin Sites: Healed tibial pin site incisions. Healed femoral pin site incisions.   Skin:  Healthy appearing. No erythema.  No ecchymosis.  No drainage.  Dermabond is off now.                 Knee Joint Swelling: No Swelling.    Alignment: Neutral.                                                         ROM Extension: 0 degrees.   ROM Flexion: 135 degrees with goniometer.     Medial Collateral Ligament Laxity:  Normal laxity. Good endpoint.   Lateral Collateral Ligament Laxity: Normal laxity. Good endpoint.   Instability: No flexion or extension instability   Anterior Drawer Test: No significant translation. Good endpoint. Posterior Drawer Test:  Stable with good endpoint   Motor Strength:   Quadriceps strength is 5 out of 5 Hamstring strength is 5 out of 5 Ankle dorsiflexion strength is 5 out of 5 Ankle plantarflexion strength is 5 out of 5   Sensation:  Light tough sensation in the lower extremity is grossly normal.  Sensory change is located lateral to incision as expected.   Pulses: Pulses are palpable at the ankle at the Dorsalis Pedis Artery.  Pulses are palpable at the Posterior Tibialis Artery.                                                                 Gait: Normal gait.   Assistive Devices: None. [de-identified] : Post Op  X-Rays Partial Knee Replacement:   Examination of the Knee: Left Right   Views: AP, Lateral, Merchant, Hip to Ankle     AP Standing View: Medial Partial Knee Replacement in good position. No signs of Loosening. No subsidence. No fracture. Good insert space thickness. Optimal tracking of femoral component on tibial insert.   Lateral View: Medial Partial Knee Replacement in good position. Fix is anatomic on both femur and tibia. Good Cement fixation.   [place] / Merchant View: Patellofemoral joint shows central tracking. Medial implant is seen tracking centrally on the tibial implant in this flexion view.     Bilateral Hip to Ankle Standing View: Knee Alignment is in good overall position. Alignment is 3 degrees Valgus on the Right. Alignment is 0 degrees Valgus on the Left. [de-identified] : Assessment:  - Post Op 7 weeks after right Partial Knee Replacement, Medial compartment, Dewayne Robotic-Assisted. - Gait is significantly improved from preop. - Walking with no pain. - Incisions healing well. - No signs of infection. - No calf tenderness. No signs of DVT. - Range of Motion is excellent at this early followup. - ROM is: 0-135 - No problem with stairs or entry and exit from cars. - Stable collateral ligament exam. No Flexion instability. - X-rays today show implant in good position with no problems.  - Patient is very satisfied with progress and outcome.   Plan:  1. Continue to work on strengthening and gait 2. Follow-up 6 more weeks

## 2024-04-19 ENCOUNTER — NON-APPOINTMENT (OUTPATIENT)
Age: 69
End: 2024-04-19

## 2024-05-08 ENCOUNTER — APPOINTMENT (OUTPATIENT)
Dept: VASCULAR SURGERY | Facility: CLINIC | Age: 69
End: 2024-05-08
Payer: COMMERCIAL

## 2024-05-08 VITALS
DIASTOLIC BLOOD PRESSURE: 64 MMHG | HEART RATE: 52 BPM | WEIGHT: 200 LBS | BODY MASS INDEX: 26.51 KG/M2 | SYSTOLIC BLOOD PRESSURE: 125 MMHG | HEIGHT: 73 IN

## 2024-05-08 DIAGNOSIS — I83.891 VARICOSE VEINS OF RIGHT LOWER EXTREMITY WITH OTHER COMPLICATIONS: ICD-10-CM

## 2024-05-08 DIAGNOSIS — Z78.9 OTHER SPECIFIED HEALTH STATUS: ICD-10-CM

## 2024-05-08 DIAGNOSIS — Z86.718 PERSONAL HISTORY OF OTHER VENOUS THROMBOSIS AND EMBOLISM: ICD-10-CM

## 2024-05-08 PROCEDURE — 99203 OFFICE O/P NEW LOW 30 MIN: CPT

## 2024-05-08 PROCEDURE — 93971 EXTREMITY STUDY: CPT

## 2024-05-08 RX ORDER — RIVAROXABAN 20 MG/1
20 TABLET, FILM COATED ORAL
Qty: 30 | Refills: 3 | Status: DISCONTINUED | COMMUNITY
Start: 2023-06-07 | End: 2024-05-08

## 2024-05-08 RX ORDER — HYDROCODONE BITARTRATE AND ACETAMINOPHEN 7.5; 325 MG/1; MG/1
7.5-325 TABLET ORAL
Qty: 24 | Refills: 0 | Status: DISCONTINUED | COMMUNITY
Start: 2024-01-19 | End: 2024-05-08

## 2024-05-08 RX ORDER — CELECOXIB 200 MG/1
200 CAPSULE ORAL TWICE DAILY
Qty: 60 | Refills: 2 | Status: DISCONTINUED | COMMUNITY
Start: 2024-01-19 | End: 2024-05-08

## 2024-05-08 RX ORDER — CEFADROXIL 500 MG/1
500 CAPSULE ORAL TWICE DAILY
Qty: 14 | Refills: 1 | Status: DISCONTINUED | COMMUNITY
Start: 2024-01-19 | End: 2024-05-08

## 2024-05-09 NOTE — REASON FOR VISIT
[Initial Evaluation] : an initial evaluation [Initial Eval - Existing Diagnosis] : an initial evaluation of an existing diagnosis

## 2024-05-13 NOTE — PHYSICAL EXAM
[Respiratory Effort] : normal respiratory effort [No Rash or Lesion] : No rash or lesion [Alert] : alert [Oriented to Person] : oriented to person [Oriented to Place] : oriented to place [Oriented to Time] : oriented to time [Calm] : calm [Varicose Veins Of Lower Extremities] : present [2+] : left 2+ [Ankle Swelling (On Exam)] : not present [] : not present [de-identified] : WN/WD [FreeTextEntry1] : Legs well perfused, RLE with mild bulging vv on medial aspect of mid-calf [de-identified] : FROM no

## 2024-05-13 NOTE — ASSESSMENT
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 Presenting with residual rLE symptomatic varicose veins.   On exam, legs well perfused. RLE with mildly bulging vv on the medial aspect of the mid-thigh to mid-calf. No edema. Skin intact with no hyperpigmentation. RLE Venous duplex showed: negative DVT/SVT, PTV patent, GSV closed through thigh past ablation, gross VV thigh + calf measuring up to 5.6mm. Findings discussed with pt. We recommended RLE stab phlebectomy for the bulging symptomatic varicose veins in the hospital under general anesthesia. All complications, risks, and alternatives discussed; all questions were answered. Pt will let us know when he wants to proceed. Otherwise, keep skin moisturized. Walking is encouraged, but standing in place or sitting for too long is not preferred. Pt will let us know if he would like to proceed.

## 2024-05-13 NOTE — PROCEDURE
[FreeTextEntry1] : RLE Venous duplex ordered to r/o insuff and eval vv, shows: negative DVT/SVT, PTV patent, GSV closed through thigh past ablation, gross VV thigh + calf: 5.6mm

## 2024-05-13 NOTE — HISTORY OF PRESENT ILLNESS
[FreeTextEntry1] : 69 y/o M referred by Dr Samson. 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   Pt resenting today for evaluation of varicose veins on his R calf. He reports that the vv do not cause him pain or discomfort, but he is unhappy with their aesthetic appearance. Denies any leg trauma, ulcers, leg tiredness, or localized burning or itching sensation over the veins. He wears compression stockings (20-30 mmhg) with little improvement of his symptoms. Of note, he underwent a RLE venous duplex in the past that demonstrated a DVT in 1 of 2 paired posterior tibial veins. He took Xarelto for 1 month which resolved the DVT. Currently not on AC therapy.  He reports that he is very physically active: swimming, hiking, walking, and skiing.  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

## 2024-05-13 NOTE — ADDENDUM
[FreeTextEntry1] : I, Dr. Delmar Armstrong, personally performed the evaluation and management (E/M) services for this new patient.  That E/M includes conducting the initial examination, assessing all conditions, and establishing the plan of care.  Today, my ACP, Jackie Ríos NP, was here to observe my evaluation and management services for this patient to be followed going forward.  Documented by Evan Hood acting as a Scribe for DELMAR ARMSTRONG on 05/08/2024.   All medical record entries made by the Scribe were at my direction and personally dictated by me, DELMAR ARMSTRONG, on 05/08/2024. I have reviewed the chart and agree that the record accurately reflects my personal performances of the history, physical exam, assessment and plan. I have also personally directed, reviewed, and agree with the discharge instructions.

## 2024-06-05 ENCOUNTER — APPOINTMENT (OUTPATIENT)
Dept: ORTHOPEDIC SURGERY | Facility: CLINIC | Age: 69
End: 2024-06-05
Payer: COMMERCIAL

## 2024-06-05 VITALS
BODY MASS INDEX: 26.51 KG/M2 | OXYGEN SATURATION: 100 % | WEIGHT: 200 LBS | HEIGHT: 73 IN | SYSTOLIC BLOOD PRESSURE: 154 MMHG | DIASTOLIC BLOOD PRESSURE: 79 MMHG | HEART RATE: 52 BPM

## 2024-06-05 DIAGNOSIS — Z96.651 PRESENCE OF RIGHT ARTIFICIAL KNEE JOINT: ICD-10-CM

## 2024-06-05 DIAGNOSIS — Z96.651 AFTERCARE FOLLOWING JOINT REPLACEMENT SURGERY: ICD-10-CM

## 2024-06-05 DIAGNOSIS — Z47.1 AFTERCARE FOLLOWING JOINT REPLACEMENT SURGERY: ICD-10-CM

## 2024-06-05 PROCEDURE — 99213 OFFICE O/P EST LOW 20 MIN: CPT

## 2024-06-05 NOTE — DISCUSSION/SUMMARY
[de-identified] : Assessment:   - Post Op 3 Months after Right  Medial Partial Knee Replacements, Dewayne Robotic-Assisted. - Cemented components - Incision clean and dry with no drainage. - Incisions well Healed. - No signs of infection. - No calf tenderness. No signs of DVT. - Pain well controlled. Not taking narcotics. - Gait is very good. - Patient has now finished Physical Therapy. - Range of Motion is excellent. 0-135 - Stable collateral ligament exam and cruciate ligament exam. - Patient is very satisfied with their progress and early outcome today and will continue to work on improving strength and flexibility.   Plan: - Continue to work on stretching and strengthening of surgical leg. - Continue with a lifelong daily home exercise or fitness center program. - Follow-up 6 months after date of surgery with X-rays. Bilateral Standing AP, Lateral, Merchant Views.

## 2024-06-05 NOTE — REASON FOR VISIT
[Follow-Up Visit] : a follow-up visit for [FreeTextEntry2] : 3 months s/p right medial PKA on 1.30.24 at [facility]

## 2024-06-05 NOTE — HISTORY OF PRESENT ILLNESS
[de-identified] : Omi is a 68 year old male who presents today 3 months s/p right medial PKA on 1.30.24 at . He states he is feeling good. He is active and working out. He is doing leg presses and light leg extensions. He tried a little running but did not feel like he was ready yet for that. We discussed getting back to the more aggressive sports is usually good around 4-6 months.   Post op Day: 3 months Surgery Type: Medial Partial Knee Arthroplasty Side of Surgery: Right Date of Surgery: 1.30.24   Pain Level: 1 Assistive Device: none Satisfaction Level: Very Satisfied   Activities: Walking, regular exercises and some biking

## 2024-06-05 NOTE — PHYSICAL EXAM
[de-identified] : Right Knee Exam:   Incision: Healed medial parapatellar incision.   Incision Pin Sites: Healed tibial pin site incisions. Healed femoral pin site incisions.   Skin:  Healthy appearing. No erythema.  No ecchymosis.  No drainage.                   Knee Joint Swelling: No Swelling.    Alignment: Neutral.                                                          ROM Extension: 0 degrees.   ROM Flexion: 135 degrees with goniometer.     Medial Collateral Ligament Laxity:  Normal laxity. Good endpoint. Lateral Collateral Ligament Laxity: Normal laxity. Good endpoint.   Instability: No flexion or extension instability   Anterior Drawer Test: No significant translation. Good endpoint. Posterior Drawer Test:  Stable with good endpoint   Motor Strength:   Quadriceps strength is 5 out of 5 Hamstring strength is 5 out of 5 Ankle dorsiflexion strength is 5 out of 5 Ankle plantarflexion strength is 5 out of 5   Sensation:  Light tough sensation in the lower extremity is grossly normal.  Sensory change is located lateral to incision as expected.   Pulses: Pulses are palpable at the ankle at the Dorsalis Pedis Artery.  Pulses are palpable at the Posterior Tibialis Artery.                                                                 Gait: Gait improving as expected after surgery.  Normal gait.   Assistive Devices: None. [de-identified] : None today.

## 2024-06-05 NOTE — DISCUSSION/SUMMARY
[de-identified] : Assessment:   - Post Op 3 Months after Right  Medial Partial Knee Replacements, Dewayne Robotic-Assisted. - Cemented components - Incision clean and dry with no drainage. - Incisions well Healed. - No signs of infection. - No calf tenderness. No signs of DVT. - Pain well controlled. Not taking narcotics. - Gait is very good. - Patient has now finished Physical Therapy. - Range of Motion is excellent. 0-135 - Stable collateral ligament exam and cruciate ligament exam. - Patient is very satisfied with their progress and early outcome today and will continue to work on improving strength and flexibility.   Plan: - Continue to work on stretching and strengthening of surgical leg. - Continue with a lifelong daily home exercise or fitness center program. - Follow-up 6 months after date of surgery with X-rays. Bilateral Standing AP, Lateral, Merchant Views.

## 2024-06-05 NOTE — HISTORY OF PRESENT ILLNESS
[de-identified] : Omi is a 68 year old male who presents today 3 months s/p right medial PKA on 1.30.24 at . He states he is feeling good. He is active and working out. He is doing leg presses and light leg extensions. He tried a little running but did not feel like he was ready yet for that. We discussed getting back to the more aggressive sports is usually good around 4-6 months.   Post op Day: 3 months Surgery Type: Medial Partial Knee Arthroplasty Side of Surgery: Right Date of Surgery: 1.30.24   Pain Level: 1 Assistive Device: none Satisfaction Level: Very Satisfied   Activities: Walking, regular exercises and some biking

## 2024-06-05 NOTE — PHYSICAL EXAM
[de-identified] : Right Knee Exam:   Incision: Healed medial parapatellar incision.   Incision Pin Sites: Healed tibial pin site incisions. Healed femoral pin site incisions.   Skin:  Healthy appearing. No erythema.  No ecchymosis.  No drainage.                   Knee Joint Swelling: No Swelling.    Alignment: Neutral.                                                          ROM Extension: 0 degrees.   ROM Flexion: 135 degrees with goniometer.     Medial Collateral Ligament Laxity:  Normal laxity. Good endpoint. Lateral Collateral Ligament Laxity: Normal laxity. Good endpoint.   Instability: No flexion or extension instability   Anterior Drawer Test: No significant translation. Good endpoint. Posterior Drawer Test:  Stable with good endpoint   Motor Strength:   Quadriceps strength is 5 out of 5 Hamstring strength is 5 out of 5 Ankle dorsiflexion strength is 5 out of 5 Ankle plantarflexion strength is 5 out of 5   Sensation:  Light tough sensation in the lower extremity is grossly normal.  Sensory change is located lateral to incision as expected.   Pulses: Pulses are palpable at the ankle at the Dorsalis Pedis Artery.  Pulses are palpable at the Posterior Tibialis Artery.                                                                 Gait: Gait improving as expected after surgery.  Normal gait.   Assistive Devices: None. [de-identified] : None today.

## 2024-07-13 ENCOUNTER — NON-APPOINTMENT (OUTPATIENT)
Age: 69
End: 2024-07-13

## 2024-08-29 NOTE — HISTORY OF PRESENT ILLNESS
[de-identified] : Omi is a 69 year old male who presents 6 months s/p right medial PKA on 1.30.24 at . He states he is feeling...   Post op Day: 6 months Surgery Type: Medial Partial Knee Arthroplasty Side of Surgery: Right Date of Surgery: 1.30.24   Pain Level:  Assistive Device:  Satisfaction Level: Very Satisfied   Activities: Walking

## 2024-08-29 NOTE — REASON FOR VISIT
[Follow-Up Visit] : a follow-up visit for [FreeTextEntry2] : 6 months s/p right medial PKA on 1.30.24 at [facility]

## 2024-08-29 NOTE — HISTORY OF PRESENT ILLNESS
[de-identified] : Omi is a 69 year old male who presents 6 months s/p right medial PKA on 1.30.24 at . He states he is feeling...   Post op Day: 6 months Surgery Type: Medial Partial Knee Arthroplasty Side of Surgery: Right Date of Surgery: 1.30.24   Pain Level:  Assistive Device:  Satisfaction Level: Very Satisfied   Activities: Walking

## 2024-08-29 NOTE — HISTORY OF PRESENT ILLNESS
[de-identified] : Omi is a 69 year old male who presents 6 months s/p right medial PKA on 1.30.24 at . He states he is feeling...   Post op Day: 6 months Surgery Type: Medial Partial Knee Arthroplasty Side of Surgery: Right Date of Surgery: 1.30.24   Pain Level:  Assistive Device:  Satisfaction Level: Very Satisfied   Activities: Walking

## 2024-09-04 ENCOUNTER — APPOINTMENT (OUTPATIENT)
Dept: ORTHOPEDIC SURGERY | Facility: CLINIC | Age: 69
End: 2024-09-04
Payer: COMMERCIAL

## 2024-09-04 ENCOUNTER — OUTPATIENT (OUTPATIENT)
Dept: OUTPATIENT SERVICES | Facility: HOSPITAL | Age: 69
LOS: 1 days | End: 2024-09-04
Payer: COMMERCIAL

## 2024-09-04 VITALS
BODY MASS INDEX: 26.51 KG/M2 | OXYGEN SATURATION: 99 % | WEIGHT: 200 LBS | HEART RATE: 51 BPM | SYSTOLIC BLOOD PRESSURE: 127 MMHG | DIASTOLIC BLOOD PRESSURE: 71 MMHG | HEIGHT: 73 IN

## 2024-09-04 DIAGNOSIS — Z96.651 AFTERCARE FOLLOWING JOINT REPLACEMENT SURGERY: ICD-10-CM

## 2024-09-04 DIAGNOSIS — Z47.1 AFTERCARE FOLLOWING JOINT REPLACEMENT SURGERY: ICD-10-CM

## 2024-09-04 DIAGNOSIS — Z96.651 PRESENCE OF RIGHT ARTIFICIAL KNEE JOINT: ICD-10-CM

## 2024-09-04 DIAGNOSIS — Z90.49 ACQUIRED ABSENCE OF OTHER SPECIFIED PARTS OF DIGESTIVE TRACT: Chronic | ICD-10-CM

## 2024-09-04 PROCEDURE — 99213 OFFICE O/P EST LOW 20 MIN: CPT

## 2024-09-04 PROCEDURE — 73562 X-RAY EXAM OF KNEE 3: CPT | Mod: 26,RT

## 2024-09-04 PROCEDURE — 77073 BONE LENGTH STUDIES: CPT

## 2024-09-04 PROCEDURE — 77073 BONE LENGTH STUDIES: CPT | Mod: 26

## 2024-09-04 PROCEDURE — 73562 X-RAY EXAM OF KNEE 3: CPT

## 2024-10-24 ENCOUNTER — RESULT REVIEW (OUTPATIENT)
Age: 69
End: 2024-10-24

## 2024-10-25 ENCOUNTER — APPOINTMENT (OUTPATIENT)
Dept: PEDIATRIC ORTHOPEDIC SURGERY | Facility: CLINIC | Age: 69
End: 2024-10-25
Payer: COMMERCIAL

## 2024-10-25 VITALS
TEMPERATURE: 96.9 F | SYSTOLIC BLOOD PRESSURE: 133 MMHG | WEIGHT: 200 LBS | HEART RATE: 74 BPM | HEIGHT: 73 IN | DIASTOLIC BLOOD PRESSURE: 71 MMHG | BODY MASS INDEX: 26.51 KG/M2

## 2024-10-25 DIAGNOSIS — S86.012A STRAIN OF LEFT ACHILLES TENDON, INITIAL ENCOUNTER: ICD-10-CM

## 2024-10-25 PROCEDURE — 99202 OFFICE O/P NEW SF 15 MIN: CPT

## 2024-10-25 RX ORDER — MELOXICAM 15 MG/1
15 TABLET ORAL
Qty: 30 | Refills: 1 | Status: ACTIVE | COMMUNITY
Start: 2024-10-25 | End: 1900-01-01

## 2024-11-22 ENCOUNTER — APPOINTMENT (OUTPATIENT)
Dept: PEDIATRIC ORTHOPEDIC SURGERY | Facility: CLINIC | Age: 69
End: 2024-11-22

## 2025-08-25 ENCOUNTER — RESULT REVIEW (OUTPATIENT)
Age: 70
End: 2025-08-25

## 2025-08-26 ENCOUNTER — TRANSCRIPTION ENCOUNTER (OUTPATIENT)
Age: 70
End: 2025-08-26